# Patient Record
Sex: MALE | Race: WHITE | NOT HISPANIC OR LATINO | URBAN - METROPOLITAN AREA
[De-identification: names, ages, dates, MRNs, and addresses within clinical notes are randomized per-mention and may not be internally consistent; named-entity substitution may affect disease eponyms.]

---

## 2023-07-03 ENCOUNTER — OFFICE VISIT (OUTPATIENT)
Dept: URBAN - METROPOLITAN AREA CLINIC 118 | Facility: CLINIC | Age: 43
End: 2023-07-03
Payer: COMMERCIAL

## 2023-07-03 ENCOUNTER — LAB OUTSIDE AN ENCOUNTER (OUTPATIENT)
Dept: URBAN - METROPOLITAN AREA CLINIC 118 | Facility: CLINIC | Age: 43
End: 2023-07-03

## 2023-07-03 ENCOUNTER — DASHBOARD ENCOUNTERS (OUTPATIENT)
Age: 43
End: 2023-07-03

## 2023-07-03 VITALS
DIASTOLIC BLOOD PRESSURE: 81 MMHG | WEIGHT: 177.6 LBS | TEMPERATURE: 98.6 F | SYSTOLIC BLOOD PRESSURE: 125 MMHG | HEART RATE: 89 BPM | HEIGHT: 70 IN | BODY MASS INDEX: 25.43 KG/M2

## 2023-07-03 DIAGNOSIS — R79.89 LFT ELEVATION: ICD-10-CM

## 2023-07-03 DIAGNOSIS — R53.83 FATIGUE, UNSPECIFIED TYPE: ICD-10-CM

## 2023-07-03 PROCEDURE — 99204 OFFICE O/P NEW MOD 45 MIN: CPT | Performed by: INTERNAL MEDICINE

## 2023-07-03 NOTE — HPI-TODAY'S VISIT:
pt presents for follow up from ED. pt reports recent general weakness with fatugue. pt reports was working hard, not eating well with up to 8 beers at night. pt denies prior known liver disease. Denies nausea, vomiting or dysphagia.  No LGI symptoms. pt went to ED, noted low Na with increased LFT's, referred to GI. Pt denies other specific complaints.

## 2023-07-06 LAB
ALBUMIN/GLOBULIN RATIO: 1.9
ALBUMIN: 4.1
ALKALINE PHOSPHATASE: 148
ALT (SGPT): 78
AST (SGOT): 87
BILIRUBIN, DIRECT: 0.2
BILIRUBIN, INDIRECT: 0.4
BILIRUBIN, TOTAL: 0.6
GLOBULIN: 2.2
HEPATITIS A IGM: (no result)
HEPATITIS B CORE ANTIBODY (IGM): (no result)
HEPATITIS B SURFACE ANTIGEN: (no result)
HEPATITIS C ANTIBODY (REFL): (no result)
PROTEIN, TOTAL: 6.3

## 2023-07-12 ENCOUNTER — TELEPHONE ENCOUNTER (OUTPATIENT)
Dept: URBAN - METROPOLITAN AREA CLINIC 118 | Facility: CLINIC | Age: 43
End: 2023-07-12

## 2023-07-31 ENCOUNTER — OFFICE VISIT (OUTPATIENT)
Dept: URBAN - METROPOLITAN AREA CLINIC 117 | Facility: CLINIC | Age: 43
End: 2023-07-31